# Patient Record
Sex: FEMALE | Race: WHITE | NOT HISPANIC OR LATINO | Employment: FULL TIME | URBAN - NONMETROPOLITAN AREA
[De-identification: names, ages, dates, MRNs, and addresses within clinical notes are randomized per-mention and may not be internally consistent; named-entity substitution may affect disease eponyms.]

---

## 2017-03-24 ENCOUNTER — OFFICE VISIT (OUTPATIENT)
Dept: URGENT CARE | Facility: PHYSICIAN GROUP | Age: 37
End: 2017-03-24
Payer: COMMERCIAL

## 2017-03-24 VITALS
HEART RATE: 81 BPM | DIASTOLIC BLOOD PRESSURE: 78 MMHG | RESPIRATION RATE: 16 BRPM | TEMPERATURE: 99.2 F | HEIGHT: 63 IN | OXYGEN SATURATION: 99 % | SYSTOLIC BLOOD PRESSURE: 106 MMHG

## 2017-03-24 DIAGNOSIS — L08.9 INFECTED CYST OF SKIN: ICD-10-CM

## 2017-03-24 DIAGNOSIS — L72.9 INFECTED CYST OF SKIN: ICD-10-CM

## 2017-03-24 PROCEDURE — 99214 OFFICE O/P EST MOD 30 MIN: CPT | Performed by: PHYSICIAN ASSISTANT

## 2017-03-24 RX ORDER — CEPHALEXIN 500 MG/1
500 CAPSULE ORAL 3 TIMES DAILY
Qty: 30 CAP | Refills: 0 | Status: SHIPPED | OUTPATIENT
Start: 2017-03-24 | End: 2017-04-03

## 2017-03-24 NOTE — MR AVS SNAPSHOT
"        Roz Jorgensenlin   3/24/2017 11:35 AM   Office Visit   MRN: [id number]    Department:  [facility]   Dept Phone:  [phone]    Description:  Female : 1980   Provider:  Marquez Ruiz PA-C           Reason for Visit     Nodule left side cheek x116, painful growing      Allergies as of 3/24/2017     No Known Allergies      You were diagnosed with     Infected cyst of skin   [075579]         Vital Signs     Blood Pressure Pulse Temperature Respirations Height       106/78 mmHg 81 37.3 °C (99.2 °F) 16 1.6 m (5' 3\")     Oxygen Saturation Smoking Status                99% Former Smoker          Basic Information     Date Of Birth Sex Race Ethnicity Preferred Language    1980 Female White Non- English      Problem List              ICD-10-CM Priority Class Noted - Resolved    Sinusitis J32.9   10/8/2012 - Present    URI (upper respiratory infection) J06.9 Medium Acute 2013 - Present    Peacock's palsy G51.0   2016 - Present    Facial droop R29.810   2016 - Present      Health Maintenance        Date Due Completion Dates    IMM DTaP/Tdap/Td Vaccine (1 - Tdap) 1997 ---    PAP SMEAR 1999 ---    IMM INFLUENZA (1) 2016 ---            Current Immunizations     No immunizations on file.      Below and/or attached are the medications your provider expects you to take. Review all of your home medications and newly ordered medications with your provider and/or pharmacist. Follow medication instructions as directed by your provider and/or pharmacist. Please keep your medication list with you and share with your provider. Update the information when medications are discontinued, doses are changed, or new medications (including over-the-counter products) are added; and carry medication information at all times in the event of emergency situations     Allergies:  No Known Allergies          Medications  Valid as of: 2017 -  2:04 PM    Generic Name Brand " Name Tablet Size Instructions for use    Cephalexin (Cap) KEFLEX 500 MG Take 1 Cap by mouth 3 times a day for 10 days.        Ergocalciferol (Cap) DRISDOL 72962 UNITS Take 1 Cap by mouth every 7 days.        Fluticasone Propionate   Spray  in nose.        L-Methylfolate   Take 15 mg by mouth every day.        PredniSONE (Tab) DELTASONE 10 MG Take 60 mg po daily for two weeks, then start tapering as follows:   50 mg po daily for two days, then  40 mg po daily for two days, then  30 mg po daily for two days, then  20 mg po daily for two days, then   10 mg po daily for two days and discontinue        .                 Medicines prescribed today were sent to:     [id number] - [place], [place] - [address] [place] [address]    [address] [place] [address]    Phone: [phone] Fax: [phone]    Open 24 Hours?: No      Medication refill instructions:       If your prescription bottle indicates you have medication refills left, it is not necessary to call your provider’s office. Please contact your pharmacy and they will refill your medication.    If your prescription bottle indicates you do not have any refills left, you may request refills at any time through one of the following ways: The online [employer] system (except Urgent Care), by calling your provider’s office, or by asking your pharmacy to contact your provider’s office with a refill request. Medication refills are processed only during regular business hours and may not be available until the next business day. Your provider may request additional information or to have a follow-up visit with you prior to refilling your medication.   *Please Note: Medication refills are assigned a new Rx number when refilled electronically. Your pharmacy may indicate that no refills were authorized even though a new prescription for the same medication is available at the pharmacy. Please request the medicine by name with the pharmacy before  contacting your provider for a refill.        Referral     A referral request has been sent to our patient care coordination department. Please allow 3-5 business days for us to process this request and contact you either by phone or mail. If you do not hear from us by the 5th business day, please call us at [phone].           [employer] Private Access Code: Activation code not generated  Current [employer] Private Status: Active

## 2017-03-24 NOTE — PROGRESS NOTES
Chief Complaint   Patient presents with   • Nodule     left side cheek x12/01/16, painful growing       HISTORY OF PRESENT ILLNESS: Patient is a 37 y.o. female who presents today because she has a tender mass in the left side of her face, and her cheek area. This has been there for over 3 months. It is getting bigger and more tender. It has not been opening up or draining. She has not been taking any medications specifically for her symptoms. She has not been applying any topical medications to area    Patient Active Problem List    Diagnosis Date Noted   • URI (upper respiratory infection) 09/18/2013     Priority: Medium     Class: Acute   • Facial droop 06/23/2016   • Bell's palsy 06/08/2016   • Sinusitis 10/08/2012       Allergies:Review of patient's allergies indicates no known allergies.    Current Outpatient Prescriptions Ordered in [facility]   Medication Sig Dispense Refill   • cephALEXin (KEFLEX) 500 MG Cap Take 1 Cap by mouth 3 times a day for 10 days. 30 Cap 0   • L-Methylfolate (DEPLIN PO) Take 15 mg by mouth every day.     • Fluticasone Propionate (FLONASE NA) Spray  in nose.     • vitamin D, Ergocalciferol, (DRISDOL) 65564 UNITS Cap capsule Take 1 Cap by mouth every 7 days. 4 Cap 3   • predniSONE (DELTASONE) 10 MG Tab Take 60 mg po daily for two weeks, then start tapering as follows:   50 mg po daily for two days, then  40 mg po daily for two days, then  30 mg po daily for two days, then  20 mg po daily for two days, then   10 mg po daily for two days and discontinue (Patient not taking: Reported on 3/24/2017) 180 Tab 0     No current Epic-ordered facility-administered medications on file.       Past Medical History   Diagnosis Date   • Seasonal allergies        Social History   Substance Use Topics   • Smoking status: Former Smoker -- 0.50 packs/day     Types: Cigarettes   • Smokeless tobacco: Never Used   • Alcohol Use: Yes      Comment: rarely       Family Status   Relation Status Death Age   • Mother  "Alive    • Father Alive    • Sister Alive      Family History   Problem Relation Age of Onset   • Heart Disease Mother      viral cardiomyopathy   • Cancer Father    • Hyperlipidemia Father    • Diabetes Father    • Hypertension Sister    • Diabetes Maternal Uncle    • Psychiatry Mother      bipolar   • Psychiatry Sister      anxiety       ROS:  Review of Systems   Constitutional: Negative for fever, chills, weight loss and malaise/fatigue.   HENT: Negative for ear pain, nosebleeds, congestion, sore throat and neck pain.    Eyes: Negative for blurred vision.   Respiratory: Negative for cough, sputum production, shortness of breath and wheezing.    Cardiovascular: Negative for chest pain, palpitations, orthopnea and leg swelling.       Exam:  Blood pressure 106/78, pulse 81, temperature 37.3 °C (99.2 °F), resp. rate 16, height 1.6 m (5' 3\"), SpO2 99 %.  General:  Well nourished, well developed female in NAD  Head:Normocephalic, atraumatic  Eyes: PERRLA, EOM within normal limits, no conjunctival injection, no scleral icterus, visual fields and acuity grossly intact.  Extremities: no clubbing, cyanosis, or edema.  Skin: On her left cheek. She has a mildly erythematous, indurated cystic mass measuring approximately 2 cm in diameter. No fluctuance, open areas or drainage.    Please note that this dictation was created using voice recognition software. I have made every reasonable attempt to correct obvious errors, but I expect that there are errors of grammar and possibly content that I did not discover before finalizing the note.    Assessment/Plan:  1. Infected cyst of skin  REFERRAL TO DERMATOLOGY    cephALEXin (KEFLEX) 500 MG Cap    referred to dermatology for further evaluation and treatment.    Followup with primary care in the next 7-10 days if not significantly improving, return to the urgent care or go to the emergency room sooner for any worsening of symptoms.         "

## 2017-09-14 ENCOUNTER — APPOINTMENT (OUTPATIENT)
Dept: SCHEDULING | Facility: IMAGING CENTER | Age: 37
End: 2017-09-14
Payer: COMMERCIAL

## 2017-09-14 ENCOUNTER — HOSPITAL ENCOUNTER (OUTPATIENT)
Facility: MEDICAL CENTER | Age: 37
End: 2017-09-14
Attending: PSYCHIATRY & NEUROLOGY

## 2017-09-14 NOTE — PSYCHIATRY
"PSYCHIATRIC CONSULTATION:  Reason for admission:   Giving out natural salts at a high school  Reason for consult: psychosis   Requesting Physician:Brittney       Legal status:   On hold     Chief Complaint: \"I've had a spiritual experience\"     HPI:   36 y/o woman with no psychiatric history that we know of. Presented after being brought in by police. She was given out natural salts of some kind at a high school and talking about Silas. They do not seem to be any sort of drug or toxin per the staff report in the ED. Of note, I had incomplete notes to review for this encounter and was given lab results, etc, over the telepsych monitor by the RN.   She is euphoric, not agitated, talking about the holy spirit moving her to do certain things. Appears the holy spirit speaks to her through feelings, and she doesn't understand why she is led to do certain things but she does it. Per the RN this appeared to be new onset? Unknown history. She had a recent workup for MS at [facility] which was negative (including an LP negative for oligoclonal bands). She presented with some numbness/tingling and Bell's palsy, which has resolved. She states she isn't that particularly anxious. She states she doesn't belong to a partidular [employer]ism but she used to and quit, she stated she didn't want to talk about it. She has no insight into why she is here and is quite bizarre. She doesn't want to hurt anyone or herself. The holy spirit isn't telling her to do anything bizarre at this time. She is actually open to the idea that the holy spirit led her to do this so she could get picked up by police to get help.     Psychiatric Review of Systems:current symptoms as reported by pt.  Depression:      Denies   Cammie: grandiose, Temple delusions   Anxiety/Panic Attacks  Denies anxiety   PTSD symptom: denies   Psychosis:+       Medical Review of Systems: as reported by pt. All systems reviewed. Only those found to be + are noted below. All others are " "negative.   Neurological:    TBIs:denies    SZs:deneis    Strokes:denies    Other:  Other medical symptoms:   Thyroid:denies    Diabetes:denies    Cardiovascular disease:    Psychiatric Examination: observed phenomenon:  Musculoskeletal(abnormal movements, gait, etc): no psychomotor agitation or retardation   Appearance: grooming wnl   Thoughts sequential and goal directed, +Moravian delusions   Speech: Nl tone, rate, and volume. mildly pressured but interruptable. Understandable.   Mood:          \"good\"   Affect:         Full and congruent   SI/HI:   None   Attention/Alertness:   Awake, alert   Memory:    Grossly intact.   Orientation:    Grossly intact.   Fund of Knowledge:      Insight/Judgement into sympoms:  Very poor   Neurological Testing:( ie clock, cube drawing, MMSE, MOCA,etc.)    Other system(s) examined: (neurological, skin, GI, etc)          Past Psychiatric Hx:   History of anxiety, possibly OCD   No other psychiatric history as far as we know. Was \"blue\" after some deaths in the family, saw a therapist. Got on folate after she got genetic testing done.   She takes folate, B12, vitamin D  Takes a drink called \"live\"     Family Psychiatric Hx:  Mother has a psychiatric illness.   \"Mom  very young because she took so many of those antidepressants\".     Social Hx:  Social History     Social History   • Marital status:      Spouse name: N/A   • Number of children: N/A   • Years of education: N/A     Occupational History   • Not on file.     Social History Main Topics   • Smoking status: Former Smoker     Packs/day: 0.50     Types: Cigarettes   • Smokeless tobacco: Never Used   • Alcohol use Yes      Comment: rarely   • Drug use: No   • Sexual activity: Yes     Partners: Male      Comment:      Other Topics Concern   • Not on file     Social History Narrative   • No narrative on file     States she left a [employer]ism that didn't align with her values   Stated her childhood was dysfunctional "   Has two kids, one is 20 and one is 16. Son who is 16 lives with them.   Lives with her  of 23 years   Niece and mom      Drug/Alcohol/Tobacco Hx:   Drugs: tried some marijuana recently, within the past months. No marijuana the last week.    Alcohol: denies    Tobacco: denies     Medical Hx: labs, MARS, medications, etc were reviewed. Only those findings of potential interest to psychiatry are noted below:  Medical Conditions:     Past Medical History:   Diagnosis Date   • Seasonal allergies      Bell's Palsy  Has had a work-up for MS in the past, states it was negative but she did have some possible demyelination     Allergies: Review of patient's allergies indicates no known allergies.    Medications (currently prescribed at [facility]):  Takes folate    ECG: QTc 433     CBC - WBC was 11.6   Hg 12.6  Platelets 268  Na 140  K 3.8  AST/ASLT 26/23  ALP PHOS 71   Ca 9.1     UDS negative   ETOH negative     Cranial Imaging: reviewed  Has a past MRI that shows some possible demyelination.   Other:      ASSESSMENT: (new dx, acuity level)  Psychotic disorder unspecified. Possible BAD, possible organic cause.         PLAN:    Recommend collateral info to see if this is truly new onset. If so, recommend work up for new onset psychosis in a 38 y/o woman.     If not, recommend starting abilify 5mg po daily.    If she tolerates and agrees to f/u, she doesn't appear to be dangerous at this time. Hold may not be necessary for long.     Thank you for the consult.

## 2018-02-20 ENCOUNTER — OFFICE VISIT (OUTPATIENT)
Dept: NEUROLOGY | Facility: MEDICAL CENTER | Age: 38
End: 2018-02-20
Payer: COMMERCIAL

## 2018-02-20 VITALS
BODY MASS INDEX: 23.74 KG/M2 | TEMPERATURE: 95.7 F | SYSTOLIC BLOOD PRESSURE: 118 MMHG | RESPIRATION RATE: 14 BRPM | WEIGHT: 134 LBS | HEART RATE: 82 BPM | DIASTOLIC BLOOD PRESSURE: 70 MMHG | OXYGEN SATURATION: 98 %

## 2018-02-20 DIAGNOSIS — G51.0 BELL'S PALSY: ICD-10-CM

## 2018-02-20 DIAGNOSIS — E55.9 VITAMIN D DEFICIENCY: ICD-10-CM

## 2018-02-20 DIAGNOSIS — R90.89 ABNORMAL FINDING ON MRI OF BRAIN: ICD-10-CM

## 2018-02-20 DIAGNOSIS — G51.39 HEMIFACIAL SPASM: ICD-10-CM

## 2018-02-20 DIAGNOSIS — Z83.2 FAMILY HISTORY OF SARCOIDOSIS: ICD-10-CM

## 2018-02-20 DIAGNOSIS — Z13.31 SCREENING FOR DEPRESSION: ICD-10-CM

## 2018-02-20 PROCEDURE — 99214 OFFICE O/P EST MOD 30 MIN: CPT | Performed by: PSYCHIATRY & NEUROLOGY

## 2018-02-20 RX ORDER — ERGOCALCIFEROL 1.25 MG/1
50000 CAPSULE ORAL
Qty: 4 CAP | Refills: 5 | Status: SHIPPED | OUTPATIENT
Start: 2018-02-20

## 2018-02-20 ASSESSMENT — PATIENT HEALTH QUESTIONNAIRE - PHQ9
CLINICAL INTERPRETATION OF PHQ2 SCORE: 5
5. POOR APPETITE OR OVEREATING: 0 - NOT AT ALL
SUM OF ALL RESPONSES TO PHQ QUESTIONS 1-9: 7

## 2018-02-21 NOTE — PROGRESS NOTES
Chief Complaint   Patient presents with   • Follow-Up     Bell palsy       Problem List Items Addressed This Visit     Bell's palsy    Relevant Orders    MR-BRAIN IAC'S W/WO    CT-CHEST (THORAX) WITH & W/O    MR-MRA HEAD-W/O      Other Visit Diagnoses     Screening for depression        Abnormal finding on MRI of brain        Relevant Orders    MR-BRAIN IAC'S W/WO    CT-CHEST (THORAX) WITH & W/O    MR-MRA HEAD-W/O    Family history of sarcoidosis        Relevant Orders    MR-BRAIN IAC'S W/WO    CT-CHEST (THORAX) WITH & W/O    MR-MRA HEAD-W/O    Vitamin D deficiency              Interim history:  Roz Solares returns in follow up. She is about 90% improved from her right bell's palsy, states second course of prednisone was helpful. She is now able to fully close her right eye and smiles mostly symmetrically. Has developed mild twitching in the right face when she blinks.     She did not have any of the MRI's of CT I ordered. She is limited by her finances and how much of her work up is covered by her insurance. She was lost to fu since 2016.     Her mood is good.     Denies headaches or respiratory symptoms at this time.     No new neurological symptoms other than described above.       Past medical history:   Past Medical History:   Diagnosis Date   • Seasonal allergies        Past surgical history:   History reviewed. No pertinent surgical history.    Family history:   Family History   Problem Relation Age of Onset   • Heart Disease Mother      viral cardiomyopathy   • Psychiatry Mother      bipolar   • Cancer Father    • Hyperlipidemia Father    • Diabetes Father    • Hypertension Sister    • Diabetes Maternal Uncle    • Psychiatry Sister      anxiety       Social history:   Social History     Social History   • Marital status:      Spouse name: N/A   • Number of children: N/A   • Years of education: N/A     Occupational History   • Not on file.     Social History Main Topics   • Smoking status:  Former Smoker     Packs/day: 0.50     Types: Cigarettes   • Smokeless tobacco: Never Used   • Alcohol use Yes      Comment: rarely   • Drug use: No   • Sexual activity: Yes     Partners: Male      Comment:      Other Topics Concern   • Not on file     Social History Narrative   • No narrative on file       Current medications:   Current Outpatient Prescriptions   Medication   • ergocalciferol (DRISDOL) 81588 UNIT capsule   • L-Methylfolate (DEPLIN PO)     No current facility-administered medications for this visit.        Medication Allergy:  No Known Allergies    Review of systems:   Constitutional: denies fever, night sweats, weight loss.   Eyes: denies acute vision change, eye pain or secretion.   Ears, Nose, Mouth, Throat: denies nasal secretion, nasal bleeding, difficulty swallowing, hearing loss, tinnitus, vertigo, ear pain, acute dental problems, oral ulcers or lesions.   Endocrine: denies recent weight changes, heat or cold intolerance, polyuria, polydypsia, polyphagia,abnormal hair growth.  Cardiovascular: denies new onset of chest pain, palpitations, syncope, or dyspnea of exertion.  Pulmonary: denies shortness of breath, new onset of cough, hemoptysis, wheezing, chest pain or flu-like symptoms.   GI: denies nausea, vomiting, diarrhea, GI bleeding, change in appetite, abdominal pain, and change in bowel habits.  : denies dysuria, urinary incontinence, hematuria.  Heme/oncology: denies history of easy bruising or bleeding. No history of cancer, DVTor PE.  Allergy/immunology: denies hives/urticaria, or itching.   Dermatologic: denies new rash, or new skin lesions.  Musculoskeletal:denies joint swelling or pain, muscle pain, neck and back pain.   Neurologic: denies headaches, acute visual changes, muscle weakness (focal or generalized), paresthesias, anesthesia, ataxia, change in speech or language, memory loss, abnormal movements, seizures, loss of consciousness, or episodes of confusion.    Psychiatric: denies symptoms of depression, anxiety, hallucinations, mood swings or changes, suicidal or homicidal thoughts.         Physical examination:   Vitals:    02/20/18 0904   BP: 118/70   Pulse: 82   Resp: 14   Temp: (!) 35.4 °C (95.7 °F)   SpO2: 98%   Weight: 60.8 kg (134 lb)     General: Patient in no acute distress, pleasant and cooperative.  HEENT: Normocephalic, no signs of acute trauma.   Neck: supple, no meningeal signs or carotid bruits. There is normal range of motion. No tenderness on exam.   Chest: clear to auscultation. No cough.   CV: RRR, no murmurs.   Skin: no signs of acute rashes or trauma.   Musculoskeletal: joints exhibit full range of motion, without any pain to palpation. There are no signs of joint or muscle swelling. There is no tenderness to deep palpation of muscles.   Psychiatric: No hallucinatory behavior. Denies symptoms of depression or suicidal ideation. Mood and affect appear normal on exam.      NEUROLOGICAL EXAM:   Mental status, orientation: Awake, alert and fully oriented.   Speech and language: speech is clear and fluent. The patient is able to name, repeat and comprehend.   Memory: There is intact recollection of recent and remote events.   Cranial nerve exam: Pupils are 3-4 mm bilaterally and equally reactive to light and accommodation. Visual fields are intact by confrontation. There is no nystagmus on primary or secondary gaze. Intact full EOM in all directions of gaze. Face appears mostly symmetric, but only if she smiles there is a tiny weakness on the right. Able to fully close her eyes, with minimal weakness on the right orbicularis oculi. There is mild and random right hemifacial spasms. Sensation in the face is intact to light touch. Uvula is midline. Palate elevates symmetrically. Tongue is midline and without any signs of tongue biting or fasciculations. Sternocleidomastoid muscles exhibit is normal strength bilaterally. Shoulder shrug is intact bilaterally.    Motor exam: Strength is 5/5 in all extremities. Tone is normal. No abnormal movements were seen on exam.   Sensory exam reveals normal sense of light touch, proprioception, vibration and pinprick in all extremities.   Deep tendon reflexes:  2+ throughout. Plantar responses are flexor. There is no clonus.   Coordination: shows a normal finger-nose-finger. Normal rapidly alternating movements.   Gait: The patient was able to get up from seated position on first attempt without requiring assistance. Found to be steady when walking. Movements were fluid with normal arm swing. The patient was able to turn without difficulties or tendency to fall. Romberg examination was unremarkable.         ANCILLARY DATA REVIEWED:      Lab Data Review:  Extensively reviewed hospital labs with pt:   B12 and folate were normal.   CRP, ESR, RPR normal.   TSH was high.   CSF: mildly elevated protein, negative oligoclonal bands, neg IGG index, neg HSV, neg zoster, neg Lyme.         Records reviewed:   Chart reviewed, including hospital records.         Imaging:   MRI brain w/wo contrast 6/8/16:       1.  No evidence of acute territorial infarct, intracranial hemorrhage or mass lesion.  2.  Rare scattered nonspecific periventricular foci of T2 and FLAIR signal hyperintensity consistent with microangiopathic ischemic change versus demyelination or gliosis.  3.  Findings of sinusitis as described above.         MRI c spine 6/9/16  1.  Mild degenerative disease in the cervical spine as described above.  2.  There is no abnormal intramedullary T2 signal intensity in the cervical spinal cord.        MRI t spine 6/9/16  1.  There is small central disc protrusion at T9-10 causing focal effacement of the ventral subarachnoid space. There is no spinal or neural foraminal stenosis.  2.  There is no abnormal intramedullary T2 signal intensity in the thoracic spinal cord to suggest edema related plaques of multiple sclerosis.           ASSESSMENT AND  PLAN:    1. Bell's palsy  - MR-BRAIN IAC'S W/WO; Future  - CT-CHEST (THORAX) WITH & W/O; Future  - MR-MRA HEAD-W/O; Future    2. Screening for depression    3. Abnormal finding on MRI of brain  - MR-BRAIN IAC'S W/WO; Future  - CT-CHEST (THORAX) WITH & W/O; Future  - MR-MRA HEAD-W/O; Future    4. Family history of sarcoidosis  - MR-BRAIN IAC'S W/WO; Future  - CT-CHEST (THORAX) WITH & W/O; Future  - MR-MRA HEAD-W/O; Future    5. Vitamin D deficiency    6. Hemifacial spasms.             CLINICAL DISCUSSION:   There is a family h/o sarcoidosis. She presented with right Bell's Palsy in 2016, was lost to fu. Had benefit from second steroidal course, has about a 10% residual deficit from right Bell's palsy and has developed some aberrant reinnervation with mild right hemifacial spasms prompted by blinking. The etiology remains obscured but sometimes that is the case. I recommended work up for sarcoidosis although this is quite an elusive disorder and may be difficult to have confirmation for it. She never had the testing done as it was expensive to her. She asked me to reorder testing today and she will think about it. I told her that it may help find an abnormal vessel or another abnormality in the brain which may help find the cause and also some potential treatment, particular to avoid recurrence. She will think about it.           FOLLOW-UP:   Return in about 3 months.        Pt agrees with plan.        Neto Wright MD  Medical Director, Epilepsy and Neurodiagnostics.   Clinical  of Neurology [facility].   Diplomate in Neurology, Epilepsy, and Electrodiagnostic Medicine.   Office: [phone]  Fax: [phone]

## 2018-06-19 ENCOUNTER — APPOINTMENT (OUTPATIENT)
Dept: NEUROLOGY | Facility: MEDICAL CENTER | Age: 38
End: 2018-06-19
Payer: COMMERCIAL

## 2018-12-12 ENCOUNTER — OFFICE VISIT (OUTPATIENT)
Dept: URGENT CARE | Facility: PHYSICIAN GROUP | Age: 38
End: 2018-12-12
Payer: COMMERCIAL

## 2018-12-12 ENCOUNTER — HOSPITAL ENCOUNTER (OUTPATIENT)
Facility: MEDICAL CENTER | Age: 38
End: 2018-12-12
Attending: PHYSICIAN ASSISTANT
Payer: COMMERCIAL

## 2018-12-12 VITALS
DIASTOLIC BLOOD PRESSURE: 82 MMHG | WEIGHT: 134 LBS | TEMPERATURE: 98.3 F | SYSTOLIC BLOOD PRESSURE: 118 MMHG | HEART RATE: 104 BPM | OXYGEN SATURATION: 97 % | BODY MASS INDEX: 23.74 KG/M2 | HEIGHT: 63 IN | RESPIRATION RATE: 14 BRPM

## 2018-12-12 DIAGNOSIS — R30.0 DYSURIA: ICD-10-CM

## 2018-12-12 DIAGNOSIS — N12 PYELONEPHRITIS: ICD-10-CM

## 2018-12-12 LAB
APPEARANCE UR: NORMAL
BILIRUB UR STRIP-MCNC: NORMAL MG/DL
COLOR UR AUTO: NORMAL
GLUCOSE UR STRIP.AUTO-MCNC: 100 MG/DL
KETONES UR STRIP.AUTO-MCNC: NORMAL MG/DL
LEUKOCYTE ESTERASE UR QL STRIP.AUTO: NORMAL
NITRITE UR QL STRIP.AUTO: POSITIVE
PH UR STRIP.AUTO: 6 [PH] (ref 5–8)
PROT UR QL STRIP: 100 MG/DL
RBC UR QL AUTO: NORMAL
SP GR UR STRIP.AUTO: 1.01
UROBILINOGEN UR STRIP-MCNC: NORMAL MG/DL

## 2018-12-12 PROCEDURE — 81002 URINALYSIS NONAUTO W/O SCOPE: CPT | Performed by: PHYSICIAN ASSISTANT

## 2018-12-12 PROCEDURE — 87086 URINE CULTURE/COLONY COUNT: CPT

## 2018-12-12 PROCEDURE — 99214 OFFICE O/P EST MOD 30 MIN: CPT | Performed by: PHYSICIAN ASSISTANT

## 2018-12-12 RX ORDER — CIPROFLOXACIN 500 MG/1
500 TABLET, FILM COATED ORAL EVERY 12 HOURS
Qty: 14 TAB | Refills: 0 | Status: SHIPPED | OUTPATIENT
Start: 2018-12-12 | End: 2018-12-19

## 2018-12-12 RX ORDER — PHENAZOPYRIDINE HYDROCHLORIDE 200 MG/1
200 TABLET, FILM COATED ORAL 3 TIMES DAILY
Qty: 6 TAB | Refills: 0 | Status: SHIPPED | OUTPATIENT
Start: 2018-12-12 | End: 2018-12-14

## 2018-12-13 DIAGNOSIS — N12 PYELONEPHRITIS: ICD-10-CM

## 2018-12-13 NOTE — PROGRESS NOTES
Chief Complaint   Patient presents with   • Blood in Urine       HISTORY OF PRESENT ILLNESS: Patient is a 38 y.o. female who presents today because she has a one-week history of feeling very poorly.  She has pain with urination, some nausea, left-sided back pain and myalgias and malaise and fatigue.  She has been taking some Tylenol for symptoms without improvement    Patient Active Problem List    Diagnosis Date Noted   • URI (upper respiratory infection) 09/18/2013     Priority: Medium     Class: Acute   • Facial droop 06/23/2016   • Bell's palsy 06/08/2016   • Sinusitis 10/08/2012       Allergies:Patient has no known allergies.    Current Outpatient Prescriptions Ordered in [facility]   Medication Sig Dispense Refill   • ciprofloxacin (CIPRO) 500 MG Tab Take 1 Tab by mouth every 12 hours for 7 days. 14 Tab 0   • phenazopyridine (PYRIDIUM) 200 MG Tab Take 1 Tab by mouth 3 times a day for 2 days. 6 Tab 0   • ergocalciferol (DRISDOL) 85267 UNIT capsule Take 1 Cap by mouth every 7 days. 4 Cap 5   • L-Methylfolate (DEPLIN PO) Take 15 mg by mouth every day.       No current [facility]-ordered facility-administered medications on file.        Past Medical History:   Diagnosis Date   • Seasonal allergies        Social History   Substance Use Topics   • Smoking status: Former Smoker     Packs/day: 0.50     Types: Cigarettes   • Smokeless tobacco: Never Used   • Alcohol use Yes      Comment: rarely       Family Status   Relation Status   • Mo Alive   • Fa Alive   • Sis Alive   • MUnc (Not Specified)   • Sis (Not Specified)     Family History   Problem Relation Age of Onset   • Heart Disease Mother         viral cardiomyopathy   • Psychiatry Mother         bipolar   • Cancer Father    • Hyperlipidemia Father    • Diabetes Father    • Hypertension Sister    • Diabetes Maternal Uncle    • Psychiatry Sister         anxiety       ROS:  Review of Systems   Constitutional: Patient reports fever, chills, myalgias and malaise/fatigue.   HENT:  "Negative for ear pain, nosebleeds, positive for nasal congestion, sore throat and neck pain.    Eyes: Negative for blurred vision.   Respiratory: Negative for cough, sputum production, shortness of breath and wheezing.    Cardiovascular: Negative for chest pain, palpitations, orthopnea and leg swelling.   Gastrointestinal: Negative for heartburn, positive for nausea, without vomiting and abdominal pain.   Genitourinary: Positive for dysuria, urgency and frequency.     Exam:  Blood pressure 118/82, pulse (!) 104, temperature 36.8 °C (98.3 °F), temperature source Temporal, resp. rate 14, height 1.6 m (5' 3\"), weight 60.8 kg (134 lb), SpO2 97 %, not currently breastfeeding.  General:  Well nourished, well developed female in NAD  Head:Normocephalic, atraumatic  Eyes: PERRLA, EOM within normal limits, no conjunctival injection, no scleral icterus, visual fields and acuity grossly intact.  Ears: Normal shape and symmetry, no tenderness, no discharge. External canals are without any significant edema or erythema. Tympanic membranes are without any inflammation, no effusion. Gross auditory acuity is intact  Nose: Symmetrical without tenderness, no discharge.  Nasal mucosa is pale and edematous bilaterally  Mouth: reasonable hygiene, no erythema exudates or tonsillar enlargement.  Neck: no masses, range of motion within normal limits, no tracheal deviation. No obvious thyroid enlargement.  Pulmonary: chest is symmetrical with respiration, no wheezes, crackles, or rhonchi.  Cardiovascular: regular rate and rhythm without murmurs, rubs, or gallops.  Extremities: no clubbing, cyanosis, or edema.    Urinalysis in the office shows protein, blood, glucose, nitrates, leukocyte esterase.  Patient used Azo prior to coming in    Please note that this dictation was created using voice recognition software. I have made every reasonable attempt to correct obvious errors, but I expect that there are errors of grammar and possibly content " that I did not discover before finalizing the note.    Assessment/Plan:  1. Pyelonephritis  Urine Culture    ciprofloxacin (CIPRO) 500 MG Tab   2. Dysuria  POCT Urinalysis    phenazopyridine (PYRIDIUM) 200 MG Tab       Followup with primary care in the next 7-10 days if not significantly improving, return to the urgent care or go to the emergency room sooner for any worsening of symptoms.

## 2018-12-15 LAB
BACTERIA UR CULT: NORMAL
SIGNIFICANT IND 70042: NORMAL
SITE SITE: NORMAL
SOURCE SOURCE: NORMAL

## 2018-12-22 ENCOUNTER — HOSPITAL ENCOUNTER (OUTPATIENT)
Facility: MEDICAL CENTER | Age: 38
End: 2018-12-22
Attending: FAMILY MEDICINE
Payer: COMMERCIAL

## 2018-12-22 ENCOUNTER — OFFICE VISIT (OUTPATIENT)
Dept: URGENT CARE | Facility: PHYSICIAN GROUP | Age: 38
End: 2018-12-22
Payer: COMMERCIAL

## 2018-12-22 VITALS
TEMPERATURE: 97.1 F | WEIGHT: 134 LBS | SYSTOLIC BLOOD PRESSURE: 122 MMHG | DIASTOLIC BLOOD PRESSURE: 80 MMHG | OXYGEN SATURATION: 95 % | HEIGHT: 63 IN | BODY MASS INDEX: 23.74 KG/M2 | HEART RATE: 88 BPM | RESPIRATION RATE: 14 BRPM

## 2018-12-22 DIAGNOSIS — N39.0 ACUTE URINARY TRACT INFECTION: ICD-10-CM

## 2018-12-22 LAB
APPEARANCE UR: NORMAL
BILIRUB UR STRIP-MCNC: NORMAL MG/DL
COLOR UR AUTO: NORMAL
GLUCOSE UR STRIP.AUTO-MCNC: NORMAL MG/DL
KETONES UR STRIP.AUTO-MCNC: NORMAL MG/DL
LEUKOCYTE ESTERASE UR QL STRIP.AUTO: NORMAL
NITRITE UR QL STRIP.AUTO: POSITIVE
PH UR STRIP.AUTO: 5.5 [PH] (ref 5–8)
PROT UR QL STRIP: NORMAL MG/DL
RBC UR QL AUTO: NORMAL
SP GR UR STRIP.AUTO: 1
UROBILINOGEN UR STRIP-MCNC: NORMAL MG/DL

## 2018-12-22 PROCEDURE — 87086 URINE CULTURE/COLONY COUNT: CPT

## 2018-12-22 PROCEDURE — 81002 URINALYSIS NONAUTO W/O SCOPE: CPT | Performed by: FAMILY MEDICINE

## 2018-12-22 PROCEDURE — 99214 OFFICE O/P EST MOD 30 MIN: CPT | Mod: 25 | Performed by: FAMILY MEDICINE

## 2018-12-22 RX ORDER — NITROFURANTOIN 25; 75 MG/1; MG/1
100 CAPSULE ORAL EVERY 12 HOURS
Qty: 14 CAP | Refills: 0 | Status: SHIPPED | OUTPATIENT
Start: 2018-12-22 | End: 2018-12-29

## 2018-12-22 NOTE — PROGRESS NOTES
Chief Complaint:    Chief Complaint   Patient presents with   • UTI       History of Present Illness:    She has dysuria, urinary frequency, and urinary urgency. She was seen for this on 12/12/18 and rx'd Cipro 500 mg BID x 7 days and Pyridium 200 mg prn. She has not improved with meds. Urine culture (12/12/18) grew: Mixed skin chu 10-50,000 cfu/mL. She does not get frequent UTIs.      Review of Systems:    Constitutional: Negative for fever, chills, and diaphoresis.   Eyes: Negative for change in vision, photophobia, pain, redness, and discharge.  ENT: Negative for ear pain, ear discharge, hearing loss, tinnitus, nasal congestion, nosebleeds, and sore throat.    Respiratory: Negative for cough, hemoptysis, sputum production, shortness of breath, wheezing, and stridor.    Cardiovascular: Negative for chest pain, palpitations, orthopnea, claudication, leg swelling, and PND.   Gastrointestinal: Negative for abdominal pain, nausea, vomiting, diarrhea, constipation, blood in stool, and melena.   Genitourinary: See HPI.  Musculoskeletal: Negative for myalgias, joint pain, neck pain, and back pain.   Skin: Negative for rash and itching.   Neurological: Negative for dizziness, tingling, tremors, sensory change, speech change, focal weakness, seizures, loss of consciousness, and headaches.   Endo: Negative for polydipsia.   Heme: Does not bruise/bleed easily.   Psychiatric/Behavioral: Negative for depression, suicidal ideas, hallucinations, memory loss and substance abuse. The patient is not nervous/anxious and does not have insomnia.        Past Medical History:    Past Medical History:   Diagnosis Date   • Seasonal allergies      Past Surgical History:    History reviewed. No pertinent surgical history.    Social History:    Social History     Social History   • Marital status:      Spouse name: N/A   • Number of children: N/A   • Years of education: N/A     Occupational History   • Not on file.     Social  "History Main Topics   • Smoking status: Former Smoker     Packs/day: 0.50     Types: Cigarettes   • Smokeless tobacco: Never Used   • Alcohol use Yes      Comment: rarely   • Drug use: No   • Sexual activity: Yes     Partners: Male      Comment:      Other Topics Concern   • Not on file     Social History Narrative   • No narrative on file     Family History:    Family History   Problem Relation Age of Onset   • Heart Disease Mother         viral cardiomyopathy   • Psychiatry Mother         bipolar   • Cancer Father    • Hyperlipidemia Father    • Diabetes Father    • Hypertension Sister    • Diabetes Maternal Uncle    • Psychiatry Sister         anxiety     Medications:    Current Outpatient Prescriptions on File Prior to Visit   Medication Sig Dispense Refill   • ergocalciferol (DRISDOL) 88195 UNIT capsule Take 1 Cap by mouth every 7 days. 4 Cap 5   • L-Methylfolate (DEPLIN PO) Take 15 mg by mouth every day.       No current facility-administered medications on file prior to visit.      Allergies:    No Known Allergies      Vitals:    Vitals:    12/22/18 1120   BP: 122/80   BP Location: Right arm   Patient Position: Sitting   BP Cuff Size: Small adult   Pulse: 88   Resp: 14   Temp: 36.2 °C (97.1 °F)   TempSrc: Temporal   SpO2: 95%   Weight: 60.8 kg (134 lb)   Height: 1.6 m (5' 3\")       Physical Exam:    Constitutional: Vital signs reviewed. Appears well-developed and well-nourished. No acute distress.   Eyes: Sclera white, conjunctivae clear.   ENT: External ears normal. Hearing normal.  Neck: Neck supple.   Pulmonary/Chest: Respirations non-labored.   Abdomen: Bowel sounds are normal active. Soft, non-distended, and non-tender to palpation.    Musculoskeletal: No CVA TTP bilaterally. Normal gait. Normal range of motion. No muscular atrophy or weakness.  Neurological: Alert and oriented to person, place, and time. Muscle tone normal. Coordination normal. Light touch and sensation normal.   Skin: No rashes " or lesions. Warm, dry, normal turgor.  Psychiatric: Normal mood and affect. Behavior is normal. Judgment and thought content normal.     Diagnostics:    POCT URINALYSIS (Order #[id number]) on 12/22/18   Component Results     Component Value Ref Range & Units Status   POC Color  Negative    POC Appearance  Negative    POC Leukocyte Esterase Neg  Negative Final   POC Nitrites Positive  Negative Final   POC Urobiligen Neg  Negative (0.2) mg/dL Final   POC Protein Neg  Negative mg/dL Final   POC Urine PH 5.5  5.0 - 8.0 Final   POC Blood Neg  Negative Final   POC Specific Gravity 1.005  <1.005 - >1.030 Final   POC Ketones Neg  Negative mg/dL Final   POC Bilirubin Neg  Negative mg/dL Final   POC Glucose Neg  Negative mg/dL Final   Last Resulted Time   Sat Dec 22, 2018 11:56 AM       Assessment / Plan:    1. Acute urinary tract infection  - POCT Urinalysis  - nitrofurantoin monohydr macro (MACROBID) 100 MG Cap; Take 1 Cap by mouth every 12 hours for 7 days.  Dispense: 14 Cap; Refill: 0  - URINE CULTURE(NEW); Future  - cefTRIAXone (ROCEPHIN) 500 mg, lidocaine (XYLOCAINE) 1 % 1.8 mL for IM use; 500 mg by Intramuscular route Once.      Discussed with her DDX, management options, and risks, benefits, and alternatives to treatment plan agreed upon.    She desires aggressive treatment due to duration of problem.    Agreeable to medications given and prescribed and send urine for culture.    Says may call [phone] (H) or [phone] (M) with urine culture result and OK to leave message with result.    Discussed expected course of duration, time for improvement, and to seek follow-up in Emergency Room, urgent care, or with PCP if getting worse at any time or not improving within expected time frame.

## 2018-12-24 ENCOUNTER — TELEPHONE (OUTPATIENT)
Dept: URGENT CARE | Facility: PHYSICIAN GROUP | Age: 38
End: 2018-12-24

## 2018-12-24 DIAGNOSIS — R30.0 DYSURIA: ICD-10-CM

## 2018-12-24 DIAGNOSIS — R35.0 URINARY FREQUENCY: ICD-10-CM

## 2018-12-24 DIAGNOSIS — R39.15 URINARY URGENCY: ICD-10-CM

## 2018-12-24 LAB
BACTERIA UR CULT: NORMAL
SIGNIFICANT IND 70042: NORMAL
SITE SITE: NORMAL
SOURCE SOURCE: NORMAL

## 2018-12-24 NOTE — TELEPHONE ENCOUNTER
Spoke to her. She still has persisting dysuria, has to use Azo prn - last night had to take 3 at a time. Usually only uses 3 in 24 hours period. Compared to visit 12/22/18, dysuria, urinary frequency, and urinary urgency have improved some but still not completely better. Discussed urine culture result. Since there has been some improvement in symptoms, advised may finish antibiotic (may be helping something not evident on cultures) and continue Azo prn. She denies vaginal discharge and chance of STI (, monogamous). Discussed possibility of Interstitial Cystitis as cause of symptoms since she has abnormal urine dips but negative cultures. Discussed Urology referral due to persisting symptoms. She is agreeable to Urology referral which I ordered and she will keep appointment if symptoms persist by time of appointment, otherwise she will cancel.

## 2020-10-01 ENCOUNTER — APPOINTMENT (RX ONLY)
Dept: URBAN - METROPOLITAN AREA CLINIC 36 | Facility: CLINIC | Age: 40
Setting detail: DERMATOLOGY
End: 2020-10-01

## 2021-04-26 ENCOUNTER — OFFICE VISIT (OUTPATIENT)
Dept: URGENT CARE | Facility: PHYSICIAN GROUP | Age: 41
End: 2021-04-26
Payer: COMMERCIAL

## 2021-04-26 VITALS
WEIGHT: 138 LBS | DIASTOLIC BLOOD PRESSURE: 68 MMHG | SYSTOLIC BLOOD PRESSURE: 114 MMHG | HEART RATE: 92 BPM | HEIGHT: 63 IN | RESPIRATION RATE: 16 BRPM | OXYGEN SATURATION: 95 % | TEMPERATURE: 98.6 F | BODY MASS INDEX: 24.45 KG/M2

## 2021-04-26 DIAGNOSIS — J30.9 ALLERGIC RHINITIS, UNSPECIFIED SEASONALITY, UNSPECIFIED TRIGGER: ICD-10-CM

## 2021-04-26 DIAGNOSIS — R06.2 WHEEZE: ICD-10-CM

## 2021-04-26 PROCEDURE — 99214 OFFICE O/P EST MOD 30 MIN: CPT | Performed by: PHYSICIAN ASSISTANT

## 2021-04-26 RX ORDER — ALBUTEROL SULFATE 90 UG/1
2 AEROSOL, METERED RESPIRATORY (INHALATION) EVERY 4 HOURS PRN
Qty: 18.2 G | Refills: 0 | Status: SHIPPED | OUTPATIENT
Start: 2021-04-26 | End: 2021-06-28 | Stop reason: SDUPTHER

## 2021-04-26 RX ORDER — METHYLPREDNISOLONE 4 MG/1
4 TABLET ORAL SEE ADMIN INSTRUCTIONS
Qty: 21 TABLET | Refills: 0 | Status: SHIPPED | OUTPATIENT
Start: 2021-04-26 | End: 2021-11-18

## 2021-04-26 RX ORDER — AMOXICILLIN 500 MG/1
CAPSULE ORAL
COMMUNITY
Start: 2021-02-05 | End: 2021-04-26

## 2021-04-26 RX ORDER — FLUTICASONE PROPIONATE 50 MCG
1 SPRAY, SUSPENSION (ML) NASAL 2 TIMES DAILY
Qty: 18.2 ML | Refills: 3 | Status: SHIPPED | OUTPATIENT
Start: 2021-04-26

## 2021-04-26 RX ORDER — ALBUTEROL SULFATE 90 UG/1
2 AEROSOL, METERED RESPIRATORY (INHALATION) EVERY 4 HOURS PRN
Qty: 18.2 G | Refills: 3 | Status: SHIPPED | OUTPATIENT
Start: 2021-04-26

## 2021-04-26 NOTE — PROGRESS NOTES
Chief Complaint   Patient presents with   • Shortness of Breath       HISTORY OF PRESENT ILLNESS: Patient is a 41 y.o. female who presents today because she reports having a Covid infection at the beginning of January, approximately 3 months ago.  Ever since then several times a week she has episodes of shortness of breath and has used her friend's albuterol inhaler which seems to give her a significant amount of relief.  She denies any fevers, chills, nausea, vomiting or diarrhea.  She would also like a refill on her Flonase    Patient Active Problem List    Diagnosis Date Noted   • URI (upper respiratory infection) 09/18/2013   • Facial droop 06/23/2016   • Bell's palsy 06/08/2016   • Sinusitis 10/08/2012       Allergies:Patient has no known allergies.    Current Outpatient Medications Ordered in Epic   Medication Sig Dispense Refill   • albuterol 108 (90 Base) MCG/ACT Aero Soln inhalation aerosol Inhale 2 Puffs every four hours as needed. With spacer device 18.2 g 0   • methylPREDNISolone (MEDROL DOSEPAK) 4 MG Tablet Therapy Pack Take 1 tablet by mouth see administration instructions. 21 tablet 0   • fluticasone (FLONASE) 50 MCG/ACT nasal spray Administer 1 Spray into affected nostril(S) 2 times a day. 18.2 mL 3   • albuterol 108 (90 Base) MCG/ACT Aero Soln inhalation aerosol Inhale 2 Puffs every four hours as needed. With spacer device 18.2 g 3   • ergocalciferol (DRISDOL) 57047 UNIT capsule Take 1 Cap by mouth every 7 days. 4 Cap 5   • L-Methylfolate (DEPLIN PO) Take 15 mg by mouth every day.     • amoxicillin (AMOXIL) 500 MG Cap TAKE 1 CAPSULE BY MOUTH EVERY 8 HOURS FOR 10 DAYS       No current [facility]-ordered facility-administered medications on file.       Past Medical History:   Diagnosis Date   • Seasonal allergies        Social History     Tobacco Use   • Smoking status: Former Smoker     Packs/day: 0.50     Types: Cigarettes   • Smokeless tobacco: Never Used   Substance Use Topics   • Alcohol use: Yes      "Comment: rarely   • Drug use: No       Family Status   Relation Name Status   • Mo  Alive   • Fa  Alive   • Sis  Alive   • MUnc  (Not Specified)   • Sis  (Not Specified)     Family History   Problem Relation Age of Onset   • Heart Disease Mother         viral cardiomyopathy   • Psychiatric Illness Mother         bipolar   • Cancer Father    • Hyperlipidemia Father    • Diabetes Father    • Hypertension Sister    • Diabetes Maternal Uncle    • Psychiatric Illness Sister         anxiety       ROS:  Review of Systems   Constitutional: Negative for fever, chills, weight loss and malaise/fatigue.   HENT: Negative for ear pain, nosebleeds, congestion, sore throat and neck pain.    Eyes: Negative for blurred vision.   Respiratory: Negative for cough,  sputum production, positive for shortness of breath and wheezing.    Cardiovascular: Negative for chest pain, palpitations, orthopnea and leg swelling.   Gastrointestinal: Negative for heartburn, nausea, vomiting and abdominal pain.   Genitourinary: Negative for dysuria, urgency and frequency.     Exam:  /68 (BP Location: Right arm, Patient Position: Sitting, BP Cuff Size: Adult)   Pulse 92   Temp 37 °C (98.6 °F) (Temporal)   Resp 16   Ht 1.6 m (5' 3\")   Wt 62.6 kg (138 lb)   SpO2 95%   General:  Well nourished, well developed female in NAD  Head:Normocephalic, atraumatic  Eyes: PERRLA, EOM within normal limits, no conjunctival injection, no scleral icterus, visual fields and acuity grossly intact.  Ears: Normal shape and symmetry, no tenderness, no discharge. External canals are without any significant edema or erythema. Tympanic membranes are without any inflammation, no effusion. Gross auditory acuity is intact  Nose: Symmetrical without tenderness, no discharge.  Mouth: reasonable hygiene, no erythema exudates or tonsillar enlargement.  Neck: no masses, range of motion within normal limits, no tracheal deviation. No obvious thyroid enlargement.  Pulmonary: " chest is symmetrical with respiration, somewhat diminished bilaterally with scattered wheezes bilaterally, no rales or rhonchi.  Cardiovascular: regular rate and rhythm without murmurs, rubs, or gallops.  Extremities: no clubbing, cyanosis, or edema.    Please note that this dictation was created using voice recognition software. I have made every reasonable attempt to correct obvious errors, but I expect that there are errors of grammar and possibly content that I did not discover before finalizing the note.    Assessment/Plan:  1. Wheeze  albuterol 108 (90 Base) MCG/ACT Aero Soln inhalation aerosol    albuterol 108 (90 Base) MCG/ACT Aero Soln inhalation aerosol   2. Allergic rhinitis, unspecified seasonality, unspecified trigger  methylPREDNISolone (MEDROL DOSEPAK) 4 MG Tablet Therapy Pack    fluticasone (FLONASE) 50 MCG/ACT nasal spray       Followup with primary care in the next 7-10 days if not significantly improving, return to the urgent care or go to the emergency room sooner for any worsening of symptoms.

## 2021-06-27 ENCOUNTER — TELEPHONE (OUTPATIENT)
Dept: URGENT CARE | Facility: PHYSICIAN GROUP | Age: 41
End: 2021-06-27

## 2021-06-27 NOTE — TELEPHONE ENCOUNTER
Patient was seen in April for SOB and given Albuterol. She requested a refill from her mail order pharmacy but she can't get a refill until 7/2/2021. She's been using her inhaler a lot more than instructed due to her breathing worsening. Would you be able to send a new rx to [facility] in [place] or do you want to re-evaluate her?

## 2021-06-28 DIAGNOSIS — R06.2 WHEEZE: ICD-10-CM

## 2021-06-28 RX ORDER — ALBUTEROL SULFATE 90 UG/1
2 AEROSOL, METERED RESPIRATORY (INHALATION) EVERY 4 HOURS PRN
Qty: 18.2 G | Refills: 0 | Status: SHIPPED | OUTPATIENT
Start: 2021-06-28 | End: 2021-11-18

## 2021-11-18 ENCOUNTER — OFFICE VISIT (OUTPATIENT)
Dept: URGENT CARE | Facility: PHYSICIAN GROUP | Age: 41
End: 2021-11-18
Payer: COMMERCIAL

## 2021-11-18 VITALS
BODY MASS INDEX: 24.45 KG/M2 | TEMPERATURE: 99.3 F | HEART RATE: 102 BPM | OXYGEN SATURATION: 95 % | HEIGHT: 63 IN | RESPIRATION RATE: 16 BRPM | SYSTOLIC BLOOD PRESSURE: 110 MMHG | WEIGHT: 138 LBS | DIASTOLIC BLOOD PRESSURE: 68 MMHG

## 2021-11-18 DIAGNOSIS — J22 LOWER RESPIRATORY TRACT INFECTION: ICD-10-CM

## 2021-11-18 DIAGNOSIS — R06.2 WHEEZING: ICD-10-CM

## 2021-11-18 DIAGNOSIS — R05.9 COUGH: ICD-10-CM

## 2021-11-18 PROCEDURE — 99214 OFFICE O/P EST MOD 30 MIN: CPT | Performed by: PHYSICIAN ASSISTANT

## 2021-11-18 RX ORDER — LORATADINE 10 MG/1
TABLET ORAL
COMMUNITY
Start: 2021-09-15

## 2021-11-18 RX ORDER — NAPROXEN 500 MG/1
TABLET ORAL
COMMUNITY
Start: 2021-09-23

## 2021-11-18 RX ORDER — PREDNISONE 20 MG/1
40 TABLET ORAL DAILY
Qty: 10 TABLET | Refills: 0 | Status: SHIPPED | OUTPATIENT
Start: 2021-11-18 | End: 2021-11-23

## 2021-11-18 RX ORDER — DILTIAZEM HYDROCHLORIDE 60 MG/1
TABLET, FILM COATED ORAL
COMMUNITY
Start: 2021-10-19

## 2021-11-18 RX ORDER — CYCLOBENZAPRINE HCL 5 MG
TABLET ORAL
COMMUNITY
Start: 2021-09-23 | End: 2021-11-18

## 2021-11-18 RX ORDER — AZITHROMYCIN 250 MG/1
TABLET, FILM COATED ORAL
Qty: 6 TABLET | Refills: 0 | Status: SHIPPED | OUTPATIENT
Start: 2021-11-18 | End: 2023-01-04

## 2021-11-18 RX ORDER — DEXTROMETHORPHAN HYDROBROMIDE AND PROMETHAZINE HYDROCHLORIDE 15; 6.25 MG/5ML; MG/5ML
5 SYRUP ORAL EVERY 4 HOURS PRN
Qty: 120 ML | Refills: 0 | Status: SHIPPED | OUTPATIENT
Start: 2021-11-18

## 2021-11-18 ASSESSMENT — ENCOUNTER SYMPTOMS: COUGH: 1

## 2021-11-19 NOTE — PROGRESS NOTES
"Subjective:   Roz Solares is a 41 y.o. female who presents for Congestion and Cough (pt states she has been coughing up brown stuff. since sunday. )      Cough    Patient is a 41-year-old female who presents the clinic with complaints of 6 days of a cough.  The cough is productive of brown sputum.  She has nasal congestion and runny nose.  She had some low-grade fevers and chills.  She has some shortness of breath 2 and 3 days ago and so she took an old prednisone for couple days which helped a lot.  She is unsure the dosage and amount she took.  Negative recent Covid PCR test. Denies any chest pain, abdominal pain, vomiting, diarrhea. Received COVID vaccine. No known exposure to COVID. History of pneumonia.       Medications:    • albuterol Aers  • cyclobenzaprine  • DEPLIN PO  • ergocalciferol  • fluticasone  • loratadine Tabs  • methylPREDNISolone Tbpk  • naproxen Tabs  • Symbicort Aero    Allergies: Patient has no known allergies.    Problem List: Roz Solares does not have any pertinent problems on file.    Surgical History:  No past surgical history on file.    Past Social Hx: Roz Solares  reports that she has quit smoking. Her smoking use included cigarettes. She smoked 0.50 packs per day. She has never used smokeless tobacco. She reports current alcohol use. She reports that she does not use drugs.     Past Family Hx:  Roz Solares family history includes Cancer in her father; Diabetes in her father and maternal uncle; Heart Disease in her mother; Hyperlipidemia in her father; Hypertension in her sister; Psychiatric Illness in her mother and sister.     Problem list, medications, and allergies reviewed by myself today in Epic.     Objective:     /68   Pulse (!) 102   Temp 37.4 °C (99.3 °F) (Temporal)   Resp 16   Ht 1.6 m (5' 3\")   Wt 62.6 kg (138 lb)   SpO2 95%   BMI 24.45 kg/m²     Physical Exam  Vitals reviewed.   Constitutional:       General: She is not in acute " distress.     Appearance: Normal appearance. She is not ill-appearing or toxic-appearing.   HENT:      Right Ear: Tympanic membrane normal.      Left Ear: Tympanic membrane normal.      Nose: Congestion present.      Mouth/Throat:      Mouth: Mucous membranes are moist.      Pharynx: Oropharynx is clear. No oropharyngeal exudate or posterior oropharyngeal erythema.   Eyes:      Conjunctiva/sclera: Conjunctivae normal.      Pupils: Pupils are equal, round, and reactive to light.   Cardiovascular:      Rate and Rhythm: Normal rate and regular rhythm.      Heart sounds: Normal heart sounds.   Pulmonary:      Effort: Pulmonary effort is normal. No accessory muscle usage or respiratory distress.      Breath sounds: No rales.      Comments: Slight wheezes and rhonchi.  Rhonchi clears with coughing.  Musculoskeletal:      Cervical back: Neck supple.   Lymphadenopathy:      Cervical: No cervical adenopathy.   Skin:     General: Skin is warm and dry.   Neurological:      General: No focal deficit present.      Mental Status: She is alert and oriented to person, place, and time.   Psychiatric:         Mood and Affect: Mood normal.         Behavior: Behavior normal.         Diagnosis and associated orders:     1. Lower respiratory tract infection  azithromycin (ZITHROMAX) 250 MG Tab   2. Wheezing  predniSONE (DELTASONE) 20 MG Tab   3. Cough  promethazine-dextromethorphan (PROMETHAZINE-DM) 6.25-15 MG/5ML syrup        Comments/MDM:     Treatment as above.   Symptomatic and supportive care:   Plenty of oral hydration and rest   Over the counter cough suppressant as directed.  Tylenol or ibuprofen for pain and fever as directed.   Warm salt water gargles for sore throat, soft foods, cool liquids.   Saline nasal spray and Flonase as a decongestant.   Claritin or Zyrtec every day.  Overall, the patient is well-appearing. They are not hypoxic, afebrile, and a normal pulmonary exam.        I personally reviewed prior external notes  and test results pertinent to today's visit. Red flags discussed and indications to present to the Emergency Department. Supportive care, natural history, differential diagnoses, and indications for immediate follow-up discussed. Patient expresses understanding and agrees to plan. Patient denies any other questions or concerns.     Follow-up with the primary care physician for recheck, reevaluation, and consideration of further management.    Time spent evaluating the patient was 30 minutes which included preparing for the visit, obtaining history, examination, ordering labs/tests/procedures/medications, independent interpretation, discussion of plan, counseling/education, medical information reconciliation, and documentation into chart.     Please note that this dictation was created using voice recognition software. I have made a reasonable attempt to correct obvious errors, but I expect that there are errors of grammar and possibly content that I did not discover before finalizing the note.    This note was electronically signed by Catalino Ng PA-C

## 2023-01-04 ENCOUNTER — OFFICE VISIT (OUTPATIENT)
Dept: URGENT CARE | Facility: PHYSICIAN GROUP | Age: 43
End: 2023-01-04
Payer: COMMERCIAL

## 2023-01-04 VITALS
DIASTOLIC BLOOD PRESSURE: 80 MMHG | BODY MASS INDEX: 25.52 KG/M2 | HEIGHT: 63 IN | TEMPERATURE: 98.2 F | RESPIRATION RATE: 16 BRPM | OXYGEN SATURATION: 98 % | WEIGHT: 144 LBS | HEART RATE: 88 BPM | SYSTOLIC BLOOD PRESSURE: 110 MMHG

## 2023-01-04 DIAGNOSIS — J01.90 ACUTE NON-RECURRENT SINUSITIS, UNSPECIFIED LOCATION: ICD-10-CM

## 2023-01-04 DIAGNOSIS — G51.8 PAIN DUE TO NEUROPATHY OF FACIAL NERVE: ICD-10-CM

## 2023-01-04 PROCEDURE — 99213 OFFICE O/P EST LOW 20 MIN: CPT | Performed by: PHYSICIAN ASSISTANT

## 2023-01-04 RX ORDER — AMOXICILLIN 500 MG/1
CAPSULE ORAL
COMMUNITY
Start: 2022-10-07 | End: 2023-01-04

## 2023-01-04 RX ORDER — ONABOTULINUMTOXINA 100 [USP'U]/1
INJECTION, POWDER, LYOPHILIZED, FOR SOLUTION INTRADERMAL; INTRAMUSCULAR
COMMUNITY
Start: 2022-12-06

## 2023-01-04 RX ORDER — AMOXICILLIN AND CLAVULANATE POTASSIUM 875; 125 MG/1; MG/1
1 TABLET, FILM COATED ORAL 2 TIMES DAILY
Qty: 14 TABLET | Refills: 0 | Status: SHIPPED | OUTPATIENT
Start: 2023-01-04 | End: 2023-01-11

## 2023-01-04 RX ORDER — KETOROLAC TROMETHAMINE 30 MG/ML
30 INJECTION, SOLUTION INTRAMUSCULAR; INTRAVENOUS ONCE
Status: COMPLETED | OUTPATIENT
Start: 2023-01-04 | End: 2023-01-04

## 2023-01-04 RX ORDER — CHLORHEXIDINE GLUCONATE ORAL RINSE 1.2 MG/ML
SOLUTION DENTAL
COMMUNITY
Start: 2022-10-07

## 2023-01-04 RX ADMIN — KETOROLAC TROMETHAMINE 30 MG: 30 INJECTION, SOLUTION INTRAMUSCULAR; INTRAVENOUS at 10:44

## 2023-01-04 ASSESSMENT — ENCOUNTER SYMPTOMS
NAUSEA: 0
FEVER: 0
DIARRHEA: 0
MYALGIAS: 0
SORE THROAT: 0
SINUS PRESSURE: 1
SHORTNESS OF BREATH: 0
EYE REDNESS: 0
COUGH: 0
VOMITING: 0
EYE DISCHARGE: 0
HEADACHES: 1

## 2023-01-04 NOTE — PROGRESS NOTES
Subjective     Roz Solares is a 42 y.o. female who presents with Facial Pain (Has nerve damage to do [employer] Palsy, pain is on the right side of her face wrapping around her ear an teeth.  Is unable to control the pain  Thinks it might be a sinus infection)          This is a new problem.  The patient presents to clinic complaining of facial pain to the right side of her face x2 days.  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  The patient states she has been experiencing intermittent episodes of facial pain over the past several months.  The patient states she is currently receiving Botox injections to help with her facial pain.  The patient states yesterday she developed increased facial pain.  The patient is concerned her increased facial pain may be related to a sinus infection.  The patient states she is also experiencing congestion, sinus pain/pressure, right-sided ear pain, and a headache.  The patient reports no associated fever.  She also reports no cough.  The patient has been taking OTC Tylenol and ibuprofen for her current symptoms.  The patient reports no recent sick contacts.  The patient states she is scheduled to follow-up with her neurologist tomorrow at 1 PM.    Sinusitis  This is a new problem. The current episode started in the past 7 days. The problem is unchanged. There has been no fever. The pain is moderate. Associated symptoms include congestion, ear pain, headaches and sinus pressure. Pertinent negatives include no coughing, shortness of breath or sore throat. Past treatments include acetaminophen (and OTC IBU).     PMH:  has a past medical history of Seasonal allergies.  MEDS:   Current Outpatient Medications:     SYMBICORT 80-4.5 MCG/ACT Aerosol, , Disp: , Rfl:     loratadine (CLARITIN) 10 MG Tab, , Disp: , Rfl:     fluticasone (FLONASE) 50 MCG/ACT nasal spray, Administer 1 Spray into affected nostril(S) 2 times a day., Disp: 18.2 mL, Rfl:  3    albuterol 108 (90 Base) MCG/ACT Aero Soln inhalation aerosol, Inhale 2 Puffs every four hours as needed. With spacer device, Disp: 18.2 g, Rfl: 3    amoxicillin (AMOXIL) 500 MG Cap, , Disp: , Rfl:     chlorhexidine (PERIDEX) 0.12 % Solution, , Disp: , Rfl:     BOTOX 100 units Recon Soln, , Disp: , Rfl:     naproxen (NAPROSYN) 500 MG Tab, TAKE ONE TABLET BY MOUTH TWICE DAILY WITH FOOD FOR AT LEAST 2 WEEKS AS NEEDED FOR BACK PAIN (Patient not taking: Reported on 1/4/2023), Disp: , Rfl:     azithromycin (ZITHROMAX) 250 MG Tab, Take 2 tabs by mouth on day 1, then take 1 tab daily for the next 4 days. (Patient not taking: Reported on 1/4/2023), Disp: 6 Tablet, Rfl: 0    promethazine-dextromethorphan (PROMETHAZINE-DM) 6.25-15 MG/5ML syrup, Take 5 mL by mouth every four hours as needed for Cough. (Patient not taking: Reported on 1/4/2023), Disp: 120 mL, Rfl: 0    ergocalciferol (DRISDOL) 93236 UNIT capsule, Take 1 Cap by mouth every 7 days. (Patient not taking: Reported on 1/4/2023), Disp: 4 Cap, Rfl: 5    L-Methylfolate (DEPLIN PO), Take 15 mg by mouth every day. (Patient not taking: Reported on 1/4/2023), Disp: , Rfl:   ALLERGIES: No Known Allergies  SURGHX: No past surgical history on file.  SOCHX:  reports that she has quit smoking. Her smoking use included cigarettes. She smoked an average of .5 packs per day. She has never used smokeless tobacco. She reports current alcohol use. She reports that she does not use drugs.  FH: Family history was reviewed, no pertinent findings to report    Review of Systems   Constitutional:  Negative for fever.   HENT:  Positive for congestion, ear pain and sinus pressure. Negative for sore throat.    Eyes:  Negative for discharge and redness.   Respiratory:  Negative for cough and shortness of breath.    Cardiovascular:  Negative for chest pain.   Gastrointestinal:  Negative for diarrhea, nausea and vomiting.   Musculoskeletal:  Negative for myalgias.   Skin:  Negative for rash.  "  Neurological:  Positive for headaches.            Objective     /80   Pulse 88   Temp 36.8 °C (98.2 °F) (Temporal)   Resp 16   Ht 1.6 m (5' 3\")   Wt 65.3 kg (144 lb)   SpO2 98%   BMI 25.51 kg/m²      Physical Exam  Constitutional:       General: She is not in acute distress.     Appearance: Normal appearance. She is not ill-appearing.   HENT:      Head: Normocephalic and atraumatic.      Right Ear: Tympanic membrane, ear canal and external ear normal.      Left Ear: Tympanic membrane, ear canal and external ear normal.      Nose: Nose normal.      Mouth/Throat:      Mouth: Mucous membranes are moist.      Pharynx: Oropharynx is clear. No posterior oropharyngeal erythema.   Eyes:      Extraocular Movements: Extraocular movements intact.      Conjunctiva/sclera: Conjunctivae normal.   Cardiovascular:      Rate and Rhythm: Normal rate and regular rhythm.      Heart sounds: Normal heart sounds.   Pulmonary:      Effort: Pulmonary effort is normal. No respiratory distress.      Breath sounds: Normal breath sounds. No wheezing.   Musculoskeletal:         General: Normal range of motion.      Cervical back: Normal range of motion and neck supple.   Skin:     General: Skin is warm and dry.      Findings: No rash.   Neurological:      Mental Status: She is alert and oriented to person, place, and time.                Progress:  Toradol 30mg IM given in clinic           Assessment & Plan          1. 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 Acute non-recurrent sinusitis, unspecified location  - amoxicillin-clavulanate (AUGMENTIN) 875-125 MG Tab; Take 1 Tablet by mouth 2 times a day for 7 days.  Dispense: 14 Tablet; Refill: 0    The patient's presenting symptoms and physical exam findings are consistent with facial pain.  The patient reports a history of facial nerve damage.  The patient's been experiencing intermittent episodes of right-sided facial pain over the past " several months.  The patient developed increasing right-sided facial pain x2 days ago.  The patient's increasing pain is likely secondary to acute sinusitis.  The patient's physical exam today in clinic was normal.  The patient is nontoxic and appears in no acute distress.  The patient's vital signs are stable and within normal limits.  She is afebrile today in clinic.  The patient was given Toradol 30 mg IM today in clinic for her facial pain.  Advised the patient that we routinely do not start patients on long-term pain management in the urgent care setting.  Advised the patient to follow-up with her neurologist as scheduled regarding her ongoing facial pain.  Recommend OTC medications and supportive care for symptomatic management.  Recommend patient follow-up with her PCP as needed.  Discussed return precautions with the patient, and she verbalized understanding.    Differential diagnoses, supportive care, and indications for immediate follow-up discussed with patient.   Instructed to return to clinic or nearest emergency department for any change in condition, further concerns, or worsening of symptoms.    OTC Tylenol or Motrin for fever/discomfort.  OTC antihistamines for symptomatic relief  OTC oral decongestants for symptomatic relief  OTC Flonase for symptomatic relief  OTC Supportive Care for Congestion - saline nasal spray or neti pot  Drink plenty of fluids  Follow-up with PCP  Return to clinic or go to the ED if symptoms worsen or fail to improve, or if the patient should develop worsening/increasing sinus pain/pressure, congestion, ear pain, sore throat, headache, cough, shortness of breath, wheezing, chest pain, fever/chills, and/or any concerning symptoms.    Discussed plan with the patient, and she agrees to the above.     I personally reviewed prior external notes and test results pertinent to today's visit.  I have independently reviewed and interpreted all diagnostics ordered during this urgent  care visit.     Please note that this dictation was created using voice recognition software. I have made every reasonable attempt to correct obvious errors, but I expect that there may be errors of grammar and possibly content that I did not discover before finalizing the note.     This note was electronically signed by Pretty Villalpando PA-C

## 2023-01-16 ENCOUNTER — SUPERVISING PHYSICIAN REVIEW (OUTPATIENT)
Dept: URGENT CARE | Facility: PHYSICIAN GROUP | Age: 43
End: 2023-01-16
Payer: COMMERCIAL